# Patient Record
Sex: FEMALE | Race: WHITE | NOT HISPANIC OR LATINO | URBAN - METROPOLITAN AREA
[De-identification: names, ages, dates, MRNs, and addresses within clinical notes are randomized per-mention and may not be internally consistent; named-entity substitution may affect disease eponyms.]

---

## 2021-10-19 ENCOUNTER — COMPLETE SKIN EXAM (OUTPATIENT)
Dept: URBAN - METROPOLITAN AREA CLINIC 10 | Facility: CLINIC | Age: 41
Setting detail: DERMATOLOGY
End: 2021-10-19

## 2021-10-19 DIAGNOSIS — Z85.828 PERSONAL HISTORY OF OTHER MALIGNANT NEOPLASM OF SKIN: ICD-10-CM

## 2021-10-19 DIAGNOSIS — D22.9 MELANOCYTIC NEVI, UNSPECIFIED: ICD-10-CM

## 2021-10-19 DIAGNOSIS — L82.1 OTHER SEBORRHEIC KERATOSIS: ICD-10-CM

## 2021-10-19 DIAGNOSIS — L90.5 SCAR CONDITIONS AND FIBROSIS OF SKIN: ICD-10-CM

## 2021-10-19 PROCEDURE — 99213 OFFICE O/P EST LOW 20 MIN: CPT

## 2022-10-20 ENCOUNTER — APPOINTMENT (OUTPATIENT)
Dept: URBAN - METROPOLITAN AREA SURGERY 16 | Age: 42
Setting detail: DERMATOLOGY
End: 2022-10-24

## 2022-10-20 DIAGNOSIS — L84 CORNS AND CALLOSITIES: ICD-10-CM

## 2022-10-20 DIAGNOSIS — D18.0 HEMANGIOMA: ICD-10-CM

## 2022-10-20 DIAGNOSIS — L85.3 XEROSIS CUTIS: ICD-10-CM

## 2022-10-20 DIAGNOSIS — D22 MELANOCYTIC NEVI: ICD-10-CM

## 2022-10-20 DIAGNOSIS — L81.4 OTHER MELANIN HYPERPIGMENTATION: ICD-10-CM

## 2022-10-20 PROBLEM — D22.62 MELANOCYTIC NEVI OF LEFT UPPER LIMB, INCLUDING SHOULDER: Status: ACTIVE | Noted: 2022-10-20

## 2022-10-20 PROBLEM — D18.01 HEMANGIOMA OF SKIN AND SUBCUTANEOUS TISSUE: Status: ACTIVE | Noted: 2022-10-20

## 2022-10-20 PROCEDURE — OTHER COUNSELING: OTHER

## 2022-10-20 PROCEDURE — 99213 OFFICE O/P EST LOW 20 MIN: CPT

## 2022-10-20 PROCEDURE — OTHER MIPS QUALITY: OTHER

## 2022-10-20 ASSESSMENT — LOCATION DETAILED DESCRIPTION DERM
LOCATION DETAILED: LEFT LATERAL ABDOMEN
LOCATION DETAILED: RIGHT PROXIMAL DORSAL FOREARM
LOCATION DETAILED: INFERIOR THORACIC SPINE
LOCATION DETAILED: LEFT PROXIMAL PRETIBIAL REGION
LOCATION DETAILED: RIGHT PROXIMAL PRETIBIAL REGION
LOCATION DETAILED: MID TRAPEZIAL NECK
LOCATION DETAILED: LEFT ANTERIOR PROXIMAL UPPER ARM
LOCATION DETAILED: RIGHT POSTERIOR SHOULDER
LOCATION DETAILED: RIGHT MEDIAL PLANTAR 1ST TOE
LOCATION DETAILED: LEFT PROXIMAL RADIAL DORSAL FOREARM
LOCATION DETAILED: LEFT POSTERIOR SHOULDER

## 2022-10-20 ASSESSMENT — LOCATION SIMPLE DESCRIPTION DERM
LOCATION SIMPLE: RIGHT PRETIBIAL REGION
LOCATION SIMPLE: RIGHT FOREARM
LOCATION SIMPLE: ABDOMEN
LOCATION SIMPLE: LEFT PRETIBIAL REGION
LOCATION SIMPLE: LEFT FOREARM
LOCATION SIMPLE: RIGHT SHOULDER
LOCATION SIMPLE: UPPER BACK
LOCATION SIMPLE: PLANTAR SURFACE OF RIGHT 1ST TOE
LOCATION SIMPLE: LEFT SHOULDER
LOCATION SIMPLE: LEFT UPPER ARM
LOCATION SIMPLE: TRAPEZIAL NECK

## 2022-10-20 ASSESSMENT — LOCATION ZONE DERM
LOCATION ZONE: ARM
LOCATION ZONE: LEG
LOCATION ZONE: TRUNK
LOCATION ZONE: NECK
LOCATION ZONE: TOE

## 2022-10-20 NOTE — HPI: FULL BODY SKIN EXAMINATION
Pt roomed by Wilner Causey LPN under my direction.      Denies known Latex allergy or symptoms of Latex sensitivity.  Medications updated and reviewed with pt.  Verified tobacco history.    Recent PHQ 2/9 Score    PHQ 2:  Date Adult PHQ 2 Score Adult PHQ 2 Interpretation   2/15/2022 0 No further screening needed       PHQ 9:         Health Maintenance Due   Topic Date Due   • Shingles Vaccine (1 of 2) Never done   • COVID-19 Vaccine (3 - Moderna risk 4-dose series) 05/09/2021   • Medicare Wellness Visit  02/18/2022       Patient is due for topics as listed above but is not proceeding with Immunization(s) COVID-19 and Shingles at this time.       Unaddressed Risk Adjusted HCC Categories and Diagnoses  HCC 22 - Morbid Obesity  - Unaddressed Dx:Class 3 Severe Obesity Due To Excess Calories With Serious Comorbidity And Body Mass Index (Bmi) Of 40.0 To 44.9 In Adult (Cms/Hcc)  HCC 84 - Cardio-Respiratory Failure and Shock  - Unaddressed Dx:Chronic Respiratory Failure, Unsp W Hypoxia Or Hypercapnia (Cms/Hcc)      Advance Directives:  discussed and AD document was given to the patient          
What Is The Reason For Today's Visit?: Full Body Skin Examination
What Is The Reason For Today's Visit? (Being Monitored For X): concerning skin lesions on an annual basis

## 2023-02-28 ENCOUNTER — APPOINTMENT (OUTPATIENT)
Dept: URBAN - METROPOLITAN AREA SURGERY 16 | Age: 43
Setting detail: DERMATOLOGY
End: 2023-03-06

## 2023-02-28 DIAGNOSIS — L30.9 DERMATITIS, UNSPECIFIED: ICD-10-CM

## 2023-02-28 DIAGNOSIS — L82.0 INFLAMED SEBORRHEIC KERATOSIS: ICD-10-CM

## 2023-02-28 PROCEDURE — OTHER MIPS QUALITY: OTHER

## 2023-02-28 PROCEDURE — 99212 OFFICE O/P EST SF 10 MIN: CPT

## 2023-02-28 PROCEDURE — OTHER COUNSELING: OTHER

## 2023-02-28 ASSESSMENT — LOCATION ZONE DERM
LOCATION ZONE: LIP
LOCATION ZONE: HAND

## 2023-02-28 ASSESSMENT — LOCATION SIMPLE DESCRIPTION DERM
LOCATION SIMPLE: LEFT LIP
LOCATION SIMPLE: LEFT HAND

## 2023-02-28 ASSESSMENT — LOCATION DETAILED DESCRIPTION DERM
LOCATION DETAILED: LEFT INFERIOR VERMILION LIP
LOCATION DETAILED: LEFT ULNAR DORSAL HAND

## 2023-02-28 ASSESSMENT — SEVERITY ASSESSMENT: SEVERITY: MILD

## 2023-08-24 ENCOUNTER — APPOINTMENT (OUTPATIENT)
Dept: URBAN - METROPOLITAN AREA SURGERY 16 | Age: 43
Setting detail: DERMATOLOGY
End: 2023-08-26

## 2023-08-24 DIAGNOSIS — D18.0 HEMANGIOMA: ICD-10-CM

## 2023-08-24 DIAGNOSIS — L738 OTHER SPECIFIED DISEASES OF HAIR AND HAIR FOLLICLES: ICD-10-CM

## 2023-08-24 DIAGNOSIS — L663 OTHER SPECIFIED DISEASES OF HAIR AND HAIR FOLLICLES: ICD-10-CM

## 2023-08-24 DIAGNOSIS — D22 MELANOCYTIC NEVI: ICD-10-CM

## 2023-08-24 DIAGNOSIS — Z71.89 OTHER SPECIFIED COUNSELING: ICD-10-CM

## 2023-08-24 PROBLEM — L02.223 FURUNCLE OF CHEST WALL: Status: ACTIVE | Noted: 2023-08-24

## 2023-08-24 PROBLEM — L02.222 FURUNCLE OF BACK [ANY PART, EXCEPT BUTTOCK]: Status: ACTIVE | Noted: 2023-08-24

## 2023-08-24 PROBLEM — L02.423 FURUNCLE OF RIGHT UPPER LIMB: Status: ACTIVE | Noted: 2023-08-24

## 2023-08-24 PROBLEM — D18.01 HEMANGIOMA OF SKIN AND SUBCUTANEOUS TISSUE: Status: ACTIVE | Noted: 2023-08-24

## 2023-08-24 PROBLEM — L02.424 FURUNCLE OF LEFT UPPER LIMB: Status: ACTIVE | Noted: 2023-08-24

## 2023-08-24 PROBLEM — D22.39 MELANOCYTIC NEVI OF OTHER PARTS OF FACE: Status: ACTIVE | Noted: 2023-08-24

## 2023-08-24 PROCEDURE — OTHER COUNSELING: OTHER

## 2023-08-24 PROCEDURE — OTHER ADDITIONAL NOTES: OTHER

## 2023-08-24 PROCEDURE — OTHER MIPS QUALITY: OTHER

## 2023-08-24 PROCEDURE — 99213 OFFICE O/P EST LOW 20 MIN: CPT

## 2023-08-24 ASSESSMENT — LOCATION DETAILED DESCRIPTION DERM
LOCATION DETAILED: RIGHT PROXIMAL POSTERIOR UPPER ARM
LOCATION DETAILED: UPPER STERNUM
LOCATION DETAILED: RIGHT INFERIOR MEDIAL UPPER BACK
LOCATION DETAILED: RIGHT LATERAL BUCCAL CHEEK
LOCATION DETAILED: LEFT SUPERIOR UPPER BACK
LOCATION DETAILED: LEFT PROXIMAL POSTERIOR UPPER ARM

## 2023-08-24 ASSESSMENT — LOCATION SIMPLE DESCRIPTION DERM
LOCATION SIMPLE: RIGHT UPPER BACK
LOCATION SIMPLE: LEFT UPPER BACK
LOCATION SIMPLE: RIGHT POSTERIOR UPPER ARM
LOCATION SIMPLE: CHEST
LOCATION SIMPLE: LEFT POSTERIOR UPPER ARM
LOCATION SIMPLE: RIGHT CHEEK

## 2023-08-24 ASSESSMENT — LOCATION ZONE DERM
LOCATION ZONE: FACE
LOCATION ZONE: ARM
LOCATION ZONE: TRUNK

## 2023-08-24 NOTE — PROCEDURE: ADDITIONAL NOTES
Render Risk Assessment In Note?: no
Detail Level: Simple
Additional Notes: Recommended to use OTC BPO wash. Offered to prescribe Clindamycin, but patient declined.

## 2024-10-30 ENCOUNTER — APPOINTMENT (OUTPATIENT)
Dept: URBAN - METROPOLITAN AREA SURGERY 16 | Age: 44
Setting detail: DERMATOLOGY
End: 2024-10-31

## 2024-10-30 DIAGNOSIS — D22 MELANOCYTIC NEVI: ICD-10-CM

## 2024-10-30 DIAGNOSIS — D18.0 HEMANGIOMA: ICD-10-CM

## 2024-10-30 DIAGNOSIS — L81.4 OTHER MELANIN HYPERPIGMENTATION: ICD-10-CM

## 2024-10-30 PROBLEM — D22.62 MELANOCYTIC NEVI OF LEFT UPPER LIMB, INCLUDING SHOULDER: Status: ACTIVE | Noted: 2024-10-30

## 2024-10-30 PROBLEM — D18.01 HEMANGIOMA OF SKIN AND SUBCUTANEOUS TISSUE: Status: ACTIVE | Noted: 2024-10-30

## 2024-10-30 PROCEDURE — 99213 OFFICE O/P EST LOW 20 MIN: CPT

## 2024-10-30 PROCEDURE — OTHER MIPS QUALITY: OTHER

## 2024-10-30 PROCEDURE — OTHER COUNSELING: OTHER

## 2024-10-30 ASSESSMENT — LOCATION SIMPLE DESCRIPTION DERM
LOCATION SIMPLE: LEFT UPPER BACK
LOCATION SIMPLE: RIGHT FOREARM
LOCATION SIMPLE: LEFT FOREARM
LOCATION SIMPLE: TRAPEZIAL NECK
LOCATION SIMPLE: RIGHT UPPER BACK
LOCATION SIMPLE: GROIN
LOCATION SIMPLE: LEFT UPPER ARM

## 2024-10-30 ASSESSMENT — LOCATION DETAILED DESCRIPTION DERM
LOCATION DETAILED: LEFT INFERIOR MEDIAL UPPER BACK
LOCATION DETAILED: LEFT SUPERIOR UPPER BACK
LOCATION DETAILED: RIGHT SUPERIOR UPPER BACK
LOCATION DETAILED: LEFT PROXIMAL DORSAL FOREARM
LOCATION DETAILED: MID TRAPEZIAL NECK
LOCATION DETAILED: LEFT SUPRAPUBIC SKIN
LOCATION DETAILED: LEFT ANTERIOR PROXIMAL UPPER ARM
LOCATION DETAILED: RIGHT PROXIMAL DORSAL FOREARM

## 2024-10-30 ASSESSMENT — LOCATION ZONE DERM
LOCATION ZONE: TRUNK
LOCATION ZONE: ARM
LOCATION ZONE: NECK

## 2025-08-26 ENCOUNTER — APPOINTMENT (OUTPATIENT)
Dept: URBAN - METROPOLITAN AREA SURGERY 16 | Age: 45
Setting detail: DERMATOLOGY
End: 2025-09-02

## 2025-08-26 DIAGNOSIS — D22 MELANOCYTIC NEVI: ICD-10-CM

## 2025-08-26 DIAGNOSIS — L82.1 OTHER SEBORRHEIC KERATOSIS: ICD-10-CM

## 2025-08-26 DIAGNOSIS — T07XXXA ABRASION OR FRICTION BURN OF OTHER, MULTIPLE, AND UNSPECIFIED SITES, WITHOUT MENTION OF INFECTION: ICD-10-CM

## 2025-08-26 DIAGNOSIS — L90.5 SCAR CONDITIONS AND FIBROSIS OF SKIN: ICD-10-CM

## 2025-08-26 DIAGNOSIS — L81.4 OTHER MELANIN HYPERPIGMENTATION: ICD-10-CM

## 2025-08-26 DIAGNOSIS — D18.0 HEMANGIOMA: ICD-10-CM

## 2025-08-26 DIAGNOSIS — L84 CORNS AND CALLOSITIES: ICD-10-CM

## 2025-08-26 PROBLEM — S90.921A UNSPECIFIED SUPERFICIAL INJURY OF RIGHT FOOT, INITIAL ENCOUNTER: Status: ACTIVE | Noted: 2025-08-26

## 2025-08-26 PROBLEM — D18.01 HEMANGIOMA OF SKIN AND SUBCUTANEOUS TISSUE: Status: ACTIVE | Noted: 2025-08-26

## 2025-08-26 PROBLEM — D22.62 MELANOCYTIC NEVI OF LEFT UPPER LIMB, INCLUDING SHOULDER: Status: ACTIVE | Noted: 2025-08-26

## 2025-08-26 PROBLEM — D22.72 MELANOCYTIC NEVI OF LEFT LOWER LIMB, INCLUDING HIP: Status: ACTIVE | Noted: 2025-08-26

## 2025-08-26 PROCEDURE — 99213 OFFICE O/P EST LOW 20 MIN: CPT

## 2025-08-26 PROCEDURE — OTHER TREATMENT REGIMEN: OTHER

## 2025-08-26 PROCEDURE — OTHER PHOTO-DOCUMENTATION: OTHER

## 2025-08-26 PROCEDURE — OTHER MIPS QUALITY: OTHER

## 2025-08-26 PROCEDURE — OTHER COUNSELING: OTHER

## 2025-08-26 ASSESSMENT — LOCATION DETAILED DESCRIPTION DERM
LOCATION DETAILED: LEFT INFERIOR VERMILION LIP
LOCATION DETAILED: RIGHT MEDIAL DORSAL FOOT
LOCATION DETAILED: LEFT ANTERIOR LATERAL PROXIMAL THIGH
LOCATION DETAILED: LEFT ANTERIOR LATERAL DISTAL THIGH
LOCATION DETAILED: LEFT MEDIAL PLANTAR 1ST TOE
LOCATION DETAILED: LEFT ANTERIOR PROXIMAL UPPER ARM

## 2025-08-26 ASSESSMENT — LOCATION SIMPLE DESCRIPTION DERM
LOCATION SIMPLE: PLANTAR SURFACE OF LEFT 1ST TOE
LOCATION SIMPLE: LEFT LIP
LOCATION SIMPLE: LEFT THIGH
LOCATION SIMPLE: RIGHT FOOT
LOCATION SIMPLE: LEFT UPPER ARM

## 2025-08-26 ASSESSMENT — LOCATION ZONE DERM
LOCATION ZONE: LIP
LOCATION ZONE: TOE
LOCATION ZONE: ARM
LOCATION ZONE: FEET
LOCATION ZONE: LEG